# Patient Record
Sex: FEMALE | Race: WHITE | NOT HISPANIC OR LATINO | Employment: OTHER | URBAN - METROPOLITAN AREA
[De-identification: names, ages, dates, MRNs, and addresses within clinical notes are randomized per-mention and may not be internally consistent; named-entity substitution may affect disease eponyms.]

---

## 2017-03-15 RX ORDER — ROFLUMILAST 500 UG/1
500 TABLET ORAL DAILY
COMMUNITY

## 2017-03-15 RX ORDER — HYDROCODONE BITARTRATE AND ACETAMINOPHEN 10; 325 MG/1; MG/1
TABLET ORAL EVERY 6 HOURS PRN
COMMUNITY

## 2017-03-15 RX ORDER — ESTRADIOL 0.5 MG/1
0.5 TABLET ORAL DAILY
COMMUNITY

## 2017-03-15 RX ORDER — ALPRAZOLAM 0.5 MG/1
0.5 TABLET ORAL DAILY PRN
COMMUNITY

## 2017-03-15 RX ORDER — BUDESONIDE AND FORMOTEROL FUMARATE DIHYDRATE 160; 4.5 UG/1; UG/1
2 AEROSOL RESPIRATORY (INHALATION) EVERY 12 HOURS
COMMUNITY

## 2017-03-15 RX ORDER — TIOTROPIUM BROMIDE 18 UG/1
18 CAPSULE ORAL; RESPIRATORY (INHALATION) DAILY
COMMUNITY

## 2017-03-15 RX ORDER — LEVOTHYROXINE SODIUM 75 UG/1
75 TABLET ORAL DAILY
COMMUNITY

## 2017-03-15 RX ORDER — FLUOXETINE HYDROCHLORIDE 40 MG/1
40 CAPSULE ORAL DAILY
COMMUNITY

## 2017-03-17 ENCOUNTER — OFFICE VISIT (OUTPATIENT)
Dept: CARDIOLOGY | Facility: CLINIC | Age: 72
End: 2017-03-17
Payer: MEDICARE

## 2017-03-17 VITALS
DIASTOLIC BLOOD PRESSURE: 68 MMHG | HEART RATE: 129 BPM | RESPIRATION RATE: 20 BRPM | WEIGHT: 116 LBS | SYSTOLIC BLOOD PRESSURE: 112 MMHG

## 2017-03-17 DIAGNOSIS — E03.9 ACQUIRED HYPOTHYROIDISM: ICD-10-CM

## 2017-03-17 DIAGNOSIS — J43.8 OTHER EMPHYSEMA: ICD-10-CM

## 2017-03-17 DIAGNOSIS — C34.92 MALIGNANT NEOPLASM OF LEFT LUNG, UNSPECIFIED PART OF LUNG: ICD-10-CM

## 2017-03-17 DIAGNOSIS — I48.19 PERSISTENT ATRIAL FIBRILLATION: ICD-10-CM

## 2017-03-17 DIAGNOSIS — Z79.01 ANTICOAGULATION ADEQUATE: ICD-10-CM

## 2017-03-17 PROBLEM — J43.9 PULMONARY EMPHYSEMA: Status: ACTIVE | Noted: 2017-03-17

## 2017-03-17 PROCEDURE — 99205 OFFICE O/P NEW HI 60 MIN: CPT | Mod: S$GLB,,, | Performed by: INTERNAL MEDICINE

## 2017-03-17 RX ORDER — DILTIAZEM HYDROCHLORIDE 120 MG/1
120 CAPSULE, EXTENDED RELEASE ORAL DAILY
Qty: 30 CAPSULE | Refills: 11 | Status: SHIPPED | OUTPATIENT
Start: 2017-03-17 | End: 2017-04-28

## 2017-03-17 NOTE — PROGRESS NOTES
Subjective:     HPI    Cardiologist: Tyler Estrada MD    I had the pleasure of seeing Nasrin Cooper in consultation at your request for the evaluation of atrial fibrillation. She is a 71-year old male with a history of severe pulmonary hypertension related to underlying COPD (currently smoking vapor cigarettes), lung cancer status-post left wedge resection several years ago, hypothyroidism on Synthroid, who was incidentally diagnosed with AF shortly after her thoracic surgery. She was started on Xarelto around this time.She has been tried on Sotalol (which caused dizziness) and Amiodarone (which caused stomach upset) over the years. An electrical cardioversion was performed several months ago, which was acutely successful. Unfortunately, she reverted back to AF within 1-week. She does not recall feeling a difference following cardioversion.    Ms. Cooper describes fatigue and BURKETT in the office today, which she says has been ongoing for 6 weeks. However, her  describes in detail worsening effort tolerance which temporally corresponds to the initial AF diagnosis, and which has only worsened over the past 6 weeks or so.    I reviewed all ECGs in the EMR. ECGs dated 3/2017, 2/2017, 12/2016, and 7/2016 all show AF with rates in the 90-100s bpm range.    An echo performed in 11/2016 revealed an EF of 45-50% with concentric LVH (septal diameter 1.4 cm) and LA enlargement.     I reviewed today's ECG tracing, which shows atrial fibrillation at 114 bpm.    Review of Systems   Constitution: Positive for malaise/fatigue. Negative for decreased appetite, weight gain and weight loss.   HENT: Negative for sore throat.    Eyes: Negative for blurred vision.   Cardiovascular: Positive for dyspnea on exertion. Negative for chest pain, irregular heartbeat, leg swelling, near-syncope, orthopnea, palpitations, paroxysmal nocturnal dyspnea and syncope.   Respiratory: Negative for shortness of breath.    Skin: Negative for rash.    Musculoskeletal: Negative for arthritis.   Gastrointestinal: Negative for abdominal pain.   Neurological: Negative for focal weakness.   Psychiatric/Behavioral: Negative for altered mental status.        Objective:    Physical Exam   Constitutional: She is oriented to person, place, and time. She appears well-developed and well-nourished. No distress.   HENT:   Head: Normocephalic and atraumatic.   Mouth/Throat: Oropharynx is clear and moist.   Eyes: Conjunctivae are normal. Pupils are equal, round, and reactive to light. No scleral icterus.   Neck: Normal range of motion. Neck supple. No JVD present. No thyromegaly present.   Cardiovascular: Normal heart sounds and intact distal pulses.  An irregularly irregular rhythm present. Tachycardia present.  Exam reveals no gallop and no friction rub.    No murmur heard.  Pulmonary/Chest: Effort normal and breath sounds normal. No respiratory distress. She has no rales.   Abdominal: Soft. Bowel sounds are normal. She exhibits no distension.   Musculoskeletal: She exhibits no edema.   Neurological: She is alert and oriented to person, place, and time.   Skin: Skin is warm and dry.   Psychiatric: She has a normal mood and affect. Her behavior is normal.         Assessment:       1. Persistent atrial fibrillation    2. Anticoagulation adequate    3. Other emphysema    4. Malignant neoplasm of left lung, unspecified part of lung    5. Acquired hypothyroidism         Plan:   In summary, Nasrin Cooper is a 71-year old female with a history of symptomatic persistent AF. Her [id number] Score is 3 (age, female, impaired LV function), so she should remain on anticoagulation indefinitely. We discussed in detail the pathophysiology of AF as well as the myriad of treatment options available to manage it. She has been intolerant to Sotalol and Amiodarone. Although she describes symptoms of fatigue and BURKETT which could be due to her AF, I am still unsure if this is indeed the cause  of this problem. For now the plan is to start her on Tikosyn, and perform an inpatient cardioversion (at either [facility] or [facility]). She should be discharged with a 48-hour Holter monitor. She will follow-up with me in 6 weeks. Hopefully at that point she is maintaining sinus rhythm and is noting an improvement in her symptoms. In the interim, I have started Diltiazem  mg daily for rate control (EF only mildly impaired and likely due to intermittent RVR).    Thank you for allowing me to participate in the care of this patient. Please do not hesitate to call me with any questions or concerns.

## 2017-04-28 ENCOUNTER — TELEPHONE (OUTPATIENT)
Dept: ELECTROPHYSIOLOGY | Facility: CLINIC | Age: 72
End: 2017-04-28

## 2017-04-28 ENCOUNTER — OFFICE VISIT (OUTPATIENT)
Dept: CARDIOLOGY | Facility: CLINIC | Age: 72
End: 2017-04-28
Payer: MEDICARE

## 2017-04-28 VITALS — SYSTOLIC BLOOD PRESSURE: 124 MMHG | DIASTOLIC BLOOD PRESSURE: 78 MMHG | HEART RATE: 108 BPM | WEIGHT: 118 LBS

## 2017-04-28 DIAGNOSIS — J43.8 OTHER EMPHYSEMA: ICD-10-CM

## 2017-04-28 DIAGNOSIS — Z79.01 ANTICOAGULATION ADEQUATE: ICD-10-CM

## 2017-04-28 DIAGNOSIS — E03.9 ACQUIRED HYPOTHYROIDISM: ICD-10-CM

## 2017-04-28 DIAGNOSIS — I48.19 PERSISTENT ATRIAL FIBRILLATION: Primary | ICD-10-CM

## 2017-04-28 DIAGNOSIS — C34.92 MALIGNANT NEOPLASM OF LEFT LUNG, UNSPECIFIED PART OF LUNG: ICD-10-CM

## 2017-04-28 PROCEDURE — 99213 OFFICE O/P EST LOW 20 MIN: CPT | Mod: S$GLB,,, | Performed by: INTERNAL MEDICINE

## 2017-04-28 RX ORDER — VERAPAMIL HYDROCHLORIDE 180 MG/1
180 CAPSULE, EXTENDED RELEASE ORAL DAILY
Qty: 30 CAPSULE | Refills: 11 | Status: SHIPPED | OUTPATIENT
Start: 2017-04-28 | End: 2017-05-29 | Stop reason: SDUPTHER

## 2017-04-28 NOTE — TELEPHONE ENCOUNTER
----- Message from Mimi Baxter RN sent at 4/28/2017  3:26 PM CDT -----  Contact: Alana from [facility] pharmacy      ----- Message -----     From: Madelaine Ashby MA     Sent: 4/28/2017   3:25 PM       To: SOY Stevens,    Please see below.    Thanks,  Madelaine  ----- Message -----     From: Rosanna Schuler     Sent: 4/28/2017   2:06 PM       To: Kenya Buck Staff    Alana called to check it the Verapimil tablet could be substituted for the capsule because they do not carry the capsule and the tablet is cheaper for the pt.  Also, there is a drug interaction w/ Simvastatin and the Verapimil.on the mg dosage.  She can be reached @ [phone].  Thanks!!

## 2017-04-28 NOTE — TELEPHONE ENCOUNTER
Discussed with Dr. Zambrano via telephone. Stated it is OK to switch Pt to another statin, with equivalent mg, and OK to change Verapamil to tablet form. I called and spoke with Renan from the pharmacy. After reviewing med list and discussing switching the Pt to crestor, renan realized that the Pt is no longer taking a statin and it has been one year since she has taken one. So no need to change anything, however, he will switch Verapamil to tablet due to costs. Denied further questions, needs, and concerns.

## 2017-04-28 NOTE — PROGRESS NOTES
Subjective:     HPI    Cardiologist: Tyler Estrada MD    I had the pleasure of seeing Nasrin Cooper in follow-up for her history of atrial fibrillation. She is a 72-year old male with a history of severe pulmonary hypertension related to underlying COPD (currently smoking vapor cigarettes), lung cancer status-post left wedge resection several years ago, hypothyroidism on Synthroid, who was incidentally diagnosed with AF shortly after her thoracic surgery. She was started on Xarelto around this time.She has been tried on Sotalol (which caused dizziness) and Amiodarone (which caused stomach upset) over the years. An electrical cardioversion was performed in late 2016 which was acutely successful. Unfortunately, she reverted back to AF within 1-week. She does not recall feeling a difference following cardioversion.    Ms. Cooper described fatigue and BURKETT in the office in 3/2017, which she says had been ongoing for 6 weeks. However, her  described in detail worsening effort tolerance which temporally corresponds to the initial AF diagnosis, and which has only worsened over the past 6 weeks or so.    I reviewed all ECGs in the EMR at her initial office visit. ECGs dated 3/2017, 2/2017, 12/2016, and 7/2016 all show AF with rates in the 90-100s bpm range. At her initial office visit with me, she was in AF at 114 bpm. I started Diltiazem, but she did not tolerate this medication.    An echo performed in 11/2016 revealed an EF of 45-50% with concentric LVH (septal diameter 1.4 cm) and LA enlargement.     At her initial office visit I started Diltiazem  mg daily. Within several days she developed hives and tongue swelling, necessitating its discontinuation.     I reviewed today's ECG tracing, which shows AF at 101 bpm.    Review of Systems   Constitution: Positive for malaise/fatigue. Negative for decreased appetite, weight gain and weight loss.   HENT: Negative for sore throat.    Eyes: Negative for blurred  vision.   Cardiovascular: Positive for dyspnea on exertion. Negative for chest pain, irregular heartbeat, leg swelling, near-syncope, orthopnea, palpitations, paroxysmal nocturnal dyspnea and syncope.   Respiratory: Negative for shortness of breath.    Skin: Negative for rash.   Musculoskeletal: Negative for arthritis.   Gastrointestinal: Negative for abdominal pain.   Neurological: Negative for focal weakness.   Psychiatric/Behavioral: Negative for altered mental status.        Objective:    Physical Exam   Constitutional: She is oriented to person, place, and time. She appears well-developed and well-nourished. No distress.   HENT:   Head: Normocephalic and atraumatic.   Mouth/Throat: Oropharynx is clear and moist.   Eyes: Conjunctivae are normal. Pupils are equal, round, and reactive to light. No scleral icterus.   Neck: Normal range of motion. Neck supple. No JVD present. No thyromegaly present.   Cardiovascular: Normal heart sounds and intact distal pulses.  An irregularly irregular rhythm present. Tachycardia present.  Exam reveals no gallop and no friction rub.    No murmur heard.  Pulmonary/Chest: Effort normal and breath sounds normal. No respiratory distress. She has no rales.   Abdominal: Soft. Bowel sounds are normal. She exhibits no distension.   Musculoskeletal: She exhibits no edema.   Neurological: She is alert and oriented to person, place, and time.   Skin: Skin is warm and dry.   Psychiatric: She has a normal mood and affect. Her behavior is normal.         Assessment:       1. Persistent atrial fibrillation    2. Anticoagulation adequate    3. Malignant neoplasm of left lung, unspecified part of lung    4. Other emphysema    5. Acquired hypothyroidism         Plan:   In summary, Nasrin Cooper is a 72-year old female with a history of symptomatic persistent AF. Her MCH0FE3-MOFe Score is 3 (age, female, impaired LV function), so she should remain on anticoagulation indefinitely. For now I would  still like to proceed with a rate-control strategy if possible. The plan is to start Verapamil  mg daily.    She will see me in 6 weeks or sooner if needed.    Thank you for allowing me to participate in the care of this patient. Please do not hesitate to call me with any questions or concerns.

## 2017-05-29 NOTE — TELEPHONE ENCOUNTER
----- Message from Jon Valdivia MA sent at 5/18/2017  9:49 AM CDT -----  Contact: Greg pt  [phone]   Please see below  ----- Message -----     From: Loree Ovalles     Sent: 5/18/2017   9:39 AM       To: Kenya Buck Staff    Pt need her 11 refills for her Verapamil 180 mg C24P sent to [facility] home delivery. The script was sent to [facility] and the insurance comp will not pay for it. Any questions you can call Mr. Cooper at the number listed.    Thanks

## 2017-05-30 RX ORDER — VERAPAMIL HYDROCHLORIDE 180 MG/1
180 CAPSULE, EXTENDED RELEASE ORAL DAILY
Qty: 30 CAPSULE | Refills: 11 | Status: SHIPPED | OUTPATIENT
Start: 2017-05-30

## 2017-06-09 ENCOUNTER — OFFICE VISIT (OUTPATIENT)
Dept: CARDIOLOGY | Facility: CLINIC | Age: 72
End: 2017-06-09
Payer: MEDICARE

## 2017-06-09 VITALS — SYSTOLIC BLOOD PRESSURE: 134 MMHG | DIASTOLIC BLOOD PRESSURE: 60 MMHG | WEIGHT: 116 LBS | HEART RATE: 91 BPM

## 2017-06-09 DIAGNOSIS — I48.19 PERSISTENT ATRIAL FIBRILLATION: Primary | ICD-10-CM

## 2017-06-09 DIAGNOSIS — Z79.01 ANTICOAGULATION ADEQUATE: ICD-10-CM

## 2017-06-09 DIAGNOSIS — C34.92 MALIGNANT NEOPLASM OF LEFT LUNG, UNSPECIFIED PART OF LUNG: ICD-10-CM

## 2017-06-09 DIAGNOSIS — E03.9 ACQUIRED HYPOTHYROIDISM: ICD-10-CM

## 2017-06-09 DIAGNOSIS — J43.8 OTHER EMPHYSEMA: ICD-10-CM

## 2017-06-09 PROCEDURE — 99214 OFFICE O/P EST MOD 30 MIN: CPT | Mod: S$GLB,,, | Performed by: INTERNAL MEDICINE

## 2017-06-09 PROCEDURE — 1159F MED LIST DOCD IN RCRD: CPT | Mod: S$GLB,,, | Performed by: INTERNAL MEDICINE

## 2017-06-09 NOTE — PROGRESS NOTES
Subjective:     [facility]    Cardiologist: Tyler Estrada MD    I had the pleasure of seeing Nasrin Cooper in follow-up for her history of atrial fibrillation. She is a 72-year old male with a history of severe pulmonary hypertension related to underlying COPD (currently smoking vapor cigarettes), lung cancer status-post left wedge resection several years ago, hypothyroidism on Synthroid, who was incidentally diagnosed with AF shortly after her thoracic surgery. She was started on Xarelto around this time.She has been tried on Sotalol (which caused dizziness) and Amiodarone (which caused stomach upset) over the years. An electrical cardioversion was performed in late 2016 which was acutely successful. Unfortunately, she reverted back to AF within 1-week. She does not recall feeling a difference following cardioversion.    Ms. Cooper described fatigue and BURKETT in the office in 3/2017, which she says had been ongoing for 6 weeks. However, her  described in detail worsening effort tolerance which temporally corresponds to the initial AF diagnosis, and which has only worsened over the past 6 weeks or so.    I reviewed all ECGs in the EMR at her initial office visit. ECGs dated 3/2017, 2/2017, 12/2016, and 7/2016 all show AF with rates in the 90-100s bpm range. At her initial office visit with me, she was in AF at 114 bpm. I started Diltiazem, but she did not tolerate this medication.    An echo performed in 11/2016 revealed an EF of 45-50% with concentric LVH (septal diameter 1.4 cm) and LA enlargement.     At her initial office visit I started Diltiazem  mg daily. Within several days she developed hives and tongue swelling, necessitating its discontinuation. At her 4/2017 visit, Verapamil  mg daily was started. This medication made her constipated, so she stopped it.    Ms. Cooper feels well in the office today. Apart from her baseline BURKETT she has no complaints.    I reviewed today's ECG tracing, which  shows AF at 82 bpm.    Review of Systems   Constitution: Negative for decreased appetite, malaise/fatigue, weight gain and weight loss.   HENT: Negative for sore throat.    Eyes: Negative for blurred vision.   Cardiovascular: Positive for dyspnea on exertion. Negative for chest pain, irregular heartbeat, leg swelling, near-syncope, orthopnea, palpitations, paroxysmal nocturnal dyspnea and syncope.   Respiratory: Negative for shortness of breath.    Skin: Negative for rash.   Musculoskeletal: Negative for arthritis.   Gastrointestinal: Negative for abdominal pain.   Neurological: Negative for focal weakness.   Psychiatric/Behavioral: Negative for altered mental status.        Objective:    Physical Exam   Constitutional: She is oriented to person, place, and time. She appears well-developed and well-nourished. No distress.   HENT:   Head: Normocephalic and atraumatic.   Mouth/Throat: Oropharynx is clear and moist.   Eyes: Conjunctivae are normal. Pupils are equal, round, and reactive to light. No scleral icterus.   Neck: Normal range of motion. Neck supple. No JVD present. No thyromegaly present.   Cardiovascular: Normal heart sounds and intact distal pulses.  An irregularly irregular rhythm present. Tachycardia present.  Exam reveals no gallop and no friction rub.    No murmur heard.  Pulmonary/Chest: Effort normal and breath sounds normal. No respiratory distress. She has no rales.   Abdominal: Soft. Bowel sounds are normal. She exhibits no distension.   Musculoskeletal: She exhibits no edema.   Neurological: She is alert and oriented to person, place, and time.   Skin: Skin is warm and dry.   Psychiatric: She has a normal mood and affect. Her behavior is normal.         Assessment:       1. Persistent atrial fibrillation    2. Anticoagulation adequate    3. Acquired hypothyroidism    4. Malignant neoplasm of left lung, unspecified part of lung    5. Other emphysema         Plan:   In summary, Nasrin Bravoeaux is a  72-year old female with a history of symptomatic persistent AF. Her [id number] Score is 3 (age, female, impaired LV function), so she should remain on anticoagulation indefinitely. Her rates are under better control today (80s bpm range) and she feels well. The plan is to hold the course.    She will see me in 6 months or sooner if needed. A 24 hour Holter and echo will be performed through Dr. Estrada's office prior to this visit.    Thank you for allowing me to participate in the care of this patient. Please do not hesitate to call me with any questions or concerns.